# Patient Record
Sex: FEMALE
[De-identification: names, ages, dates, MRNs, and addresses within clinical notes are randomized per-mention and may not be internally consistent; named-entity substitution may affect disease eponyms.]

---

## 2020-01-08 PROBLEM — Z00.00 ENCOUNTER FOR PREVENTIVE HEALTH EXAMINATION: Status: ACTIVE | Noted: 2020-01-08

## 2020-01-09 ENCOUNTER — APPOINTMENT (OUTPATIENT)
Dept: ORTHOPEDIC SURGERY | Facility: CLINIC | Age: 45
End: 2020-01-09
Payer: COMMERCIAL

## 2020-01-09 VITALS
HEIGHT: 70.5 IN | DIASTOLIC BLOOD PRESSURE: 70 MMHG | WEIGHT: 145 LBS | BODY MASS INDEX: 20.53 KG/M2 | OXYGEN SATURATION: 98 % | SYSTOLIC BLOOD PRESSURE: 110 MMHG | HEART RATE: 66 BPM

## 2020-01-09 PROCEDURE — 99203 OFFICE O/P NEW LOW 30 MIN: CPT

## 2020-01-22 NOTE — HISTORY OF PRESENT ILLNESS
[de-identified] : Jasmyn Winters is an active 45 yo woman with ongoing right knee issues since May. She developed progressive medial knee pain and reduced ROM after playing tennis. She was seen by a local orthopedist in May and was sent for an MRI which shows a medial meniscus tear . Arthroscopy was recommended but she opted for conservative management and did well for quite some time with activity modification and acupuncture. She attempted to play some tennis over the Christmas holiday and her knee began to bother her more. She has been icing and taking advil with some relief. She denies any locking or buckling.

## 2020-01-22 NOTE — PHYSICAL EXAM
[de-identified] : MRI of the right knee shows a horizontal cleavage tear in the posterior horn of the medial meniscus [de-identified] : The patient is a well developed, well nourished female in no apparent distress. She is alert and oriented X 3 with a pleasant mood and appropriate affect. \par \par On physical examination of the right knee,her ROM is 0-120 degrees. The patient walks with a normal gait and stands in neutral alignment. There is trace  effusion. No warmth or erythema is noted. The patella is non tender to palpation medially or laterally. There is no crepitus noted. The apprehension and grind tests are negative. The extensor mechanism is intact. There is medial joint line tenderness. The Bebeto sign is positive. The Lachman and pivot shift tests are negative. There is no varus or valgus laxity at 0 or 30 degrees. No posterolateral or anteromedial laxity is noted. No masses are palpable. No other soft tissue or bony tenderness is noted. Quadriceps weakness is noted. Neurovascular function is intact.

## 2020-01-22 NOTE — END OF VISIT
[[email or web address]] : All medical record entries made by JENN Barlow, acting as a scribe for this encounter under the direction of Josh Cervantes MD . I have reviewed the chart and agree that the record accurately reflects my personal performance of the history, physical exam, assessment and plan. I have also personally directed, reviewed, and agreed with the chart.

## 2020-01-22 NOTE — DISCUSSION/SUMMARY
[de-identified] : Ms Winters has a symptomatic medial meniscus tear. She has not yet tried a course of supervised PT. She will try PT for the next six weeks. If unimproved she will consider arthroscopy. We will see her back in six weeks for reevaluation. All questions were answered. She will call if any issues arise.

## 2020-02-18 DIAGNOSIS — S83.241A OTHER TEAR OF MEDIAL MENISCUS, CURRENT INJURY, RIGHT KNEE, INITIAL ENCOUNTER: ICD-10-CM

## 2020-02-18 RX ORDER — DICLOFENAC SODIUM 10 MG/G
1 GEL TOPICAL DAILY
Qty: 2 | Refills: 2 | Status: ACTIVE | COMMUNITY
Start: 2020-02-18 | End: 1900-01-01